# Patient Record
(demographics unavailable — no encounter records)

---

## 2025-01-01 NOTE — HEALTH RISK ASSESSMENT
[Fair] :  ~his/her~ mood as fair [Intercurrent hospitalizations] : was admitted to the hospital  [Intercurrent Urgi Care visits] : went to urgent care [2 - 3 times a week (3 pts)] : 2 - 3  times a week (3 points) [1 or 2 (0 pts)] : 1 or 2 (0 points) [Never (0 pts)] : Never (0 points) [No] : In the past 12 months have you used drugs other than those required for medical reasons? No [No falls in past year] : Patient reported no falls in the past year [0] : 2) Feeling down, depressed, or hopeless: Not at all (0) [PHQ-2 Negative - No further assessment needed] : PHQ-2 Negative - No further assessment needed [PHQ-9 Negative - No further assessment needed] : PHQ-9 Negative - No further assessment needed [Never] : Never [HIV Test offered] : HIV Test offered [Hepatitis C test offered] : Hepatitis C test offered [None] : None [With Family] : lives with family [Employed] : employed [College] : College [] :  [# Of Children ___] : has [unfilled] children [Sexually Active] : sexually active [Feels Safe at Home] : Feels safe at home [Fully functional (bathing, dressing, toileting, transferring, walking, feeding)] : Fully functional (bathing, dressing, toileting, transferring, walking, feeding) [Fully functional (using the telephone, shopping, preparing meals, housekeeping, doing laundry, using] : Fully functional and needs no help or supervision to perform IADLs (using the telephone, shopping, preparing meals, housekeeping, doing laundry, using transportation, managing medications and managing finances) [Reports normal functional visual acuity (ie: able to read med bottle)] : Reports normal functional visual acuity [Reports changes in dental health] : Reports changes in dental health [Smoke Detector] : smoke detector [Carbon Monoxide Detector] : carbon monoxide detector [Seat Belt] :  uses seat belt [de-identified] : viral uri /dog bite [Audit-CScore] : 3 [de-identified] : walking dog daily - 1x/ per day sometimes 2x/ day [de-identified] : has tried intermittent fasting- has lost weight on that.   [CZN2Adnvk] : 0 [Change in mental status noted] : No change in mental status noted [Language] : denies difficulty with language [Behavior] : denies difficulty with behavior [Learning/Retaining New Information] : denies difficulty learning/retaining new information [Handling Complex Tasks] : denies difficulty handling complex tasks [Reasoning] : denies difficulty with reasoning [Spatial Ability and Orientation] : denies difficulty with spatial ability and orientation [High Risk Behavior] : no high risk behavior [Reports changes in hearing] : Reports no changes in hearing [Reports changes in vision] : Reports no changes in vision [MammogramDate] : 2/2024 [PapSmearDate] : 6/2024 [ColonoscopyDate] : 11/13/24 [de-identified] : lives with  and 3 children;  [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

## 2025-01-01 NOTE — HISTORY OF PRESENT ILLNESS
[FreeTextEntry1] : annual visit [de-identified] : 49 year old female w/ PMH of  anxiety presents for her annual physical.  Patient is a  , works and has 3 children.  She is concerned about her sleeping. She says she use to take Xanax sometimes which helped her with her anxiety but is  interested in trying something to help her sleep. She says she has trouble falling asleep and staying asleep.  She also feels some right elbow discomfort. Also feels tired.  Started her early menopausal symptoms.  All other ROS is negative.

## 2025-01-01 NOTE — HEALTH RISK ASSESSMENT
[Fair] :  ~his/her~ mood as fair [Intercurrent hospitalizations] : was admitted to the hospital  [Intercurrent Urgi Care visits] : went to urgent care [2 - 3 times a week (3 pts)] : 2 - 3  times a week (3 points) [1 or 2 (0 pts)] : 1 or 2 (0 points) [Never (0 pts)] : Never (0 points) [No] : In the past 12 months have you used drugs other than those required for medical reasons? No [No falls in past year] : Patient reported no falls in the past year [0] : 2) Feeling down, depressed, or hopeless: Not at all (0) [PHQ-2 Negative - No further assessment needed] : PHQ-2 Negative - No further assessment needed [PHQ-9 Negative - No further assessment needed] : PHQ-9 Negative - No further assessment needed [Never] : Never [HIV Test offered] : HIV Test offered [Hepatitis C test offered] : Hepatitis C test offered [None] : None [With Family] : lives with family [Employed] : employed [College] : College [] :  [# Of Children ___] : has [unfilled] children [Sexually Active] : sexually active [Feels Safe at Home] : Feels safe at home [Fully functional (bathing, dressing, toileting, transferring, walking, feeding)] : Fully functional (bathing, dressing, toileting, transferring, walking, feeding) [Fully functional (using the telephone, shopping, preparing meals, housekeeping, doing laundry, using] : Fully functional and needs no help or supervision to perform IADLs (using the telephone, shopping, preparing meals, housekeeping, doing laundry, using transportation, managing medications and managing finances) [Reports normal functional visual acuity (ie: able to read med bottle)] : Reports normal functional visual acuity [Reports changes in dental health] : Reports changes in dental health [Smoke Detector] : smoke detector [Carbon Monoxide Detector] : carbon monoxide detector [Seat Belt] :  uses seat belt [de-identified] : viral uri /dog bite [Audit-CScore] : 3 [de-identified] : walking dog daily - 1x/ per day sometimes 2x/ day [de-identified] : has tried intermittent fasting- has lost weight on that.   [QRF4Pljmb] : 0 [Change in mental status noted] : No change in mental status noted [Language] : denies difficulty with language [Behavior] : denies difficulty with behavior [Learning/Retaining New Information] : denies difficulty learning/retaining new information [Handling Complex Tasks] : denies difficulty handling complex tasks [Reasoning] : denies difficulty with reasoning [Spatial Ability and Orientation] : denies difficulty with spatial ability and orientation [High Risk Behavior] : no high risk behavior [Reports changes in hearing] : Reports no changes in hearing [Reports changes in vision] : Reports no changes in vision [MammogramDate] : 2/2024 [PapSmearDate] : 6/2024 [ColonoscopyDate] : 11/13/24 [de-identified] : lives with  and 3 children;  [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

## 2025-01-01 NOTE — HISTORY OF PRESENT ILLNESS
[FreeTextEntry1] : annual visit [de-identified] : 49 year old female w/ PMH of  anxiety presents for her annual physical.  Patient is a  , works and has 3 children.  She is concerned about her sleeping. She says she use to take Xanax sometimes which helped her with her anxiety but is  interested in trying something to help her sleep. She says she has trouble falling asleep and staying asleep.  She also feels some right elbow discomfort. Also feels tired.  Started her early menopausal symptoms.  All other ROS is negative.

## 2025-01-01 NOTE — PLAN
[FreeTextEntry1] : 49 year old female w/ PMH of  anxiety presents for her annual physical.  Patient is a  , works and has 3 children.  She is concerned about her sleeping. She says she use to take Xanax sometimes which helped her with her anxiety but is  interested in trying something to help her sleep. She says she has trouble falling asleep and staying asleep.  She also feels some right elbow discomfort. Also feels tired.  Started her early menopausal symptoms.  All other ROS is negative.   Annual >> blood work reviewed  Insomnia >>will send trazadone  Anxiety >>coping well but having breakthrough anxiety >>will send Xanax   right elbow tendinitis? >>ibuprophen >>rest   Palpitations >>saw cardio recently >>was sent home with a zio patch; patient said it wasnt comfortable so she hasnt put on.  I emphasized importance of the patch. She said she will put on.

## 2025-03-28 NOTE — PLAN
[FreeTextEntry1] :   50 year old female presents for concerns about shingles. past few days patient has a break out of what appears to be pimples, one on her forehead, nose. There is a stye on her eye lid, on the right side. She has associated tingling feeling that goes around the right eye brow and right forehead and up on the right side of her scalp. she went to urgent care regarding her eye. they told her it is a stye and they gave her oral amoxicillin and acyclovir for possible shingles.    right eye stye >>recommend warm compress TID, and frequent splashes of warm water with soft massage of eye lid several times a day. On exam looks like stye has ruptured and there is very mild fluid release.  >>of note , son had a stye few weeks ago.  possible cross contamination?  Questionable Shingles early flare >>NO vesicles and no clear shingles on exam however patient describing tingling and sharp feeling that goes across her forehead on right side over to the right side of her scalp, is somewhat suspicious.  >>patient has hx of shingles before on her abdomen >>Recommend patient to continue with Acyclovir 
IMPROVE-DD Application Not Available

## 2025-03-28 NOTE — HISTORY OF PRESENT ILLNESS
[FreeTextEntry1] : stye on eye / possible shingles on forehead? [de-identified] : 50 year old female presents for concerns about shingles. past few days patient has a break out of what appears to be pimples, one on her forehead, nose. There is a stye on her eye lid, on the right side. She has associated tingling feeling that goes around the right eye brow and right forehead and up on the right side of her scalp. she went to urgent care regarding her eye. they told her it is a stye and they gave her oral amoxicillin and acyclovir for possible early shingles on forehead/ scalp.  All other ROS is negative.